# Patient Record
Sex: MALE | Race: WHITE | Employment: UNEMPLOYED | URBAN - METROPOLITAN AREA
[De-identification: names, ages, dates, MRNs, and addresses within clinical notes are randomized per-mention and may not be internally consistent; named-entity substitution may affect disease eponyms.]

---

## 2020-07-13 ENCOUNTER — HOSPITAL ENCOUNTER (EMERGENCY)
Age: 55
Discharge: HOME | End: 2020-07-13
Attending: EMERGENCY MEDICINE
Payer: MEDICARE

## 2020-07-13 VITALS
TEMPERATURE: 97.8 F | WEIGHT: 305 LBS | HEART RATE: 80 BPM | BODY MASS INDEX: 46.23 KG/M2 | SYSTOLIC BLOOD PRESSURE: 126 MMHG | HEIGHT: 68 IN | RESPIRATION RATE: 13 BRPM | OXYGEN SATURATION: 93 % | DIASTOLIC BLOOD PRESSURE: 77 MMHG

## 2020-07-13 PROCEDURE — 96365 THER/PROPH/DIAG IV INF INIT: CPT

## 2020-07-13 PROCEDURE — 12005 RPR S/N/A/GEN/TRK12.6-20.0CM: CPT

## 2020-07-13 PROCEDURE — 90715 TDAP VACCINE 7 YRS/> IM: CPT | Performed by: STUDENT IN AN ORGANIZED HEALTH CARE EDUCATION/TRAINING PROGRAM

## 2020-07-13 PROCEDURE — 96376 TX/PRO/DX INJ SAME DRUG ADON: CPT

## 2020-07-13 PROCEDURE — 6370000000 HC RX 637 (ALT 250 FOR IP): Performed by: STUDENT IN AN ORGANIZED HEALTH CARE EDUCATION/TRAINING PROGRAM

## 2020-07-13 PROCEDURE — 96375 TX/PRO/DX INJ NEW DRUG ADDON: CPT

## 2020-07-13 PROCEDURE — 99283 EMERGENCY DEPT VISIT LOW MDM: CPT

## 2020-07-13 PROCEDURE — 6370000000 HC RX 637 (ALT 250 FOR IP)

## 2020-07-13 PROCEDURE — 6360000002 HC RX W HCPCS: Performed by: STUDENT IN AN ORGANIZED HEALTH CARE EDUCATION/TRAINING PROGRAM

## 2020-07-13 PROCEDURE — 2580000003 HC RX 258: Performed by: STUDENT IN AN ORGANIZED HEALTH CARE EDUCATION/TRAINING PROGRAM

## 2020-07-13 PROCEDURE — 90471 IMMUNIZATION ADMIN: CPT | Performed by: STUDENT IN AN ORGANIZED HEALTH CARE EDUCATION/TRAINING PROGRAM

## 2020-07-13 RX ORDER — DOXYCYCLINE 100 MG/1
100 TABLET ORAL 2 TIMES DAILY
Qty: 20 TABLET | Refills: 0 | Status: SHIPPED | OUTPATIENT
Start: 2020-07-13 | End: 2020-07-23

## 2020-07-13 RX ORDER — CEFAZOLIN SODIUM 1 G/50ML
INJECTION, SOLUTION INTRAVENOUS
Status: DISCONTINUED
Start: 2020-07-13 | End: 2020-07-13 | Stop reason: HOSPADM

## 2020-07-13 RX ADMIN — Medication 3 ML: at 15:12

## 2020-07-13 RX ADMIN — HYDROMORPHONE HYDROCHLORIDE 1 MG: 1 INJECTION, SOLUTION INTRAMUSCULAR; INTRAVENOUS; SUBCUTANEOUS at 13:54

## 2020-07-13 RX ADMIN — Medication 6 ML: at 15:13

## 2020-07-13 RX ADMIN — Medication 3 ML: at 13:16

## 2020-07-13 RX ADMIN — HYDROMORPHONE HYDROCHLORIDE 1 MG: 1 INJECTION, SOLUTION INTRAMUSCULAR; INTRAVENOUS; SUBCUTANEOUS at 13:15

## 2020-07-13 RX ADMIN — HYDROMORPHONE HYDROCHLORIDE 1 MG: 1 INJECTION, SOLUTION INTRAMUSCULAR; INTRAVENOUS; SUBCUTANEOUS at 13:04

## 2020-07-13 RX ADMIN — Medication 3 ML: at 15:13

## 2020-07-13 RX ADMIN — TETANUS TOXOID, REDUCED DIPHTHERIA TOXOID AND ACELLULAR PERTUSSIS VACCINE, ADSORBED 0.5 ML: 5; 2.5; 8; 8; 2.5 SUSPENSION INTRAMUSCULAR at 13:03

## 2020-07-13 RX ADMIN — CEFAZOLIN 1 G: 1 INJECTION, POWDER, FOR SOLUTION INTRAMUSCULAR; INTRAVENOUS at 13:25

## 2020-07-13 ASSESSMENT — ENCOUNTER SYMPTOMS
SHORTNESS OF BREATH: 0
NAUSEA: 0
SORE THROAT: 0
RHINORRHEA: 0
ABDOMINAL PAIN: 0
CHEST TIGHTNESS: 0
VOMITING: 0
CONSTIPATION: 0
DIARRHEA: 0

## 2020-07-13 ASSESSMENT — PAIN SCALES - GENERAL
PAINLEVEL_OUTOF10: 10
PAINLEVEL_OUTOF10: 7
PAINLEVEL_OUTOF10: 10

## 2020-07-13 NOTE — ED PROVIDER NOTES
[facility]     Emergency Department     Faculty Note/ Attestation      Pt Name: Britni Burgos                                       MRN: [id number]  [place] 1965  Date of evaluation: 7/13/2020  Patients PCP:    No primary care provider on file. Attestation  I performed a history and physical examination of the patient/ or directly observed  and discussed management with the resident. I reviewed the residents note and agree with the documented findings and plan of care. Any areas of disagreement are noted on the chart. I was personally present for the key portions of any procedures. I have documented in the chart those procedures where I was not present during the key portions. I have reviewed the emergency nurses triage note. I agree with the chief complaint, past medical history, past surgical history, allergies, medications, social and family history as documented unless otherwise noted below. For Physician Assistant/ Nurse Practitioner cases/documentation I have personally evaluated this patient and have completed at least one if not all key elements of the E/M (history, physical exam, and MDM). Additional findings are as noted. Initial Screens:        [place] Coma Scale  Eye Opening: Spontaneous  Best Verbal Response: Oriented  Best Motor Response: Obeys commands  [place] Coma Scale Score: 15    Vitals:    Vitals:    07/13/20 1237   BP: 126/77   Pulse: 80   Resp: 13   Temp: 97.8 °F (36.6 °C)   TempSrc: Oral   SpO2: 93%   Weight: (!) 305 lb (138.3 kg)   Height: 5' 8\" (1.727 m)       Chief Complaint      Chief Complaint   Patient presents with    Laceration     leg laceration R lower leg          height is 5' 8\" (1.727 m) and weight is 305 lb (138.3 kg) (abnormal). His oral temperature is 97.8 °F (36.6 °C). His blood pressure is 126/77 and his pulse is 80. His respiration is 13 and oxygen saturation is 93%.             DIAGNOSTIC RESULTS       RADIOLOGY:   No orders to display         LABS:  Labs Reviewed - No data to display      EMERGENCY DEPARTMENT COURSE:     -------------------------      BP: 126/77, Temp: 97.8 °F (36.6 °C), Pulse: 80, Resp: 13    System Problem List     There is no problem list on file for this patient. Comments  Chronic Prob List noted          Sharp MD, F.A.C.E.P.   Attending Emergency Physician            Rick Bangura MD  07/13/20 [id number]

## 2020-07-13 NOTE — ED NOTES
Bed: 24  Expected date:   Expected time:   Means of arrival:   Comments:  Sadi Swanson RN  07/13/20 [id number]

## 2020-07-13 NOTE — ED NOTES
Dr. Ramsey Slice at bedside to perform complex laceration repair.       Juany Nava RN  07/13/20 3432

## 2020-07-13 NOTE — ED PROVIDER NOTES
abdominal pain, constipation, diarrhea, nausea and vomiting. Genitourinary: Negative for dysuria and urgency. Skin: Positive for wound. Neurological: Negative for dizziness, syncope, weakness and light-headedness. PHYSICAL EXAM   (up to 7 for level 4, 8 or more forlevel 5)      INITIAL VITALS:   Vitals:    07/13/20 1237   BP: 126/77   Pulse: 80   Resp: 13   Temp: 97.8 °F (36.6 °C)   SpO2: 93%        Physical Exam  Vitals signs and nursing note reviewed. Constitutional:       Appearance: Normal appearance. He is obese. HENT:      Head: Normocephalic and atraumatic. Nose: Nose normal.      Mouth/Throat:      Mouth: Mucous membranes are moist.   Eyes:      Extraocular Movements: Extraocular movements intact. Pupils: Pupils are equal, round, and reactive to light. Neck:      Musculoskeletal: Normal range of motion. Cardiovascular:      Rate and Rhythm: Normal rate. Pulses: Normal pulses. Heart sounds: Normal heart sounds. Pulmonary:      Breath sounds: Normal breath sounds. Abdominal:      General: Bowel sounds are normal.      Palpations: Abdomen is soft. Tenderness: There is no abdominal tenderness. Musculoskeletal: Normal range of motion. Skin:     General: Skin is warm. Capillary Refill: Capillary refill takes less than 2 seconds. Findings: Signs of injury and laceration present. No bruising or ecchymosis. Comments: Patient with a sizable U-shaped laceration. Each side of the you is approximately 4 to 5 cm in length. Laceration involves the subcutaneous adipose tissue, but no muscular involvement from what I can see during my initial examination. Neurological:      General: No focal deficit present. Mental Status: He is alert. DIFFERENTIAL  DIAGNOSIS       Initial MDM/Plan: 54 y.o. male who presents with a large U-shaped laceration to his right lower shin.   Patient was on a fishing boat when he ran into a seat pedestal, resulting in the laceration. Patient immediately applied a tourniquet to stop the bleeding. EMS applied pressure dressing and achieved hemostasis. Plan to irrigate wound, closed subcutaneous tissue with simple interrupted 3-0 Vicryl sutures, and closed skin using staples. Patient to receive Dilaudid for pain control, Ancef 1 g, LET for anesthesia of the wound. Will discharge with doxycycline and close follow-up for wound check. DIAGNOSTIC RESULTS / EMERGENCYDEPARTMENT COURSE / MDM       EMERGENCY DEPARTMENT COURSE:  ED Course as of Jul 13 1504   Mon Jul 13, 2020   1323 Patient tolerant to pain medicine, has Dilaudid pain pump that runs continuously for chronic neck pain. Gave 1 mg of Dilaudid, patient was still in severe pain. Second 1 mg Dilaudid dose made patient more comfortable. I applied to 3 mL LET syringes to open wound. Will give 15 minutes before reevaluation and wound exploration and irrigation. Tetanus given, 1 g of Ancef ordered    [JT]   1339 Pt's wound numb. Will start irrigation    [JT]      ED Course User Index  [JT] Clare Scott MD          PROCEDURES:  Lac Repair    Date/Time: 7/13/2020 3:05 PM  Performed by: Clare Scott MD  Authorized by: Titus Lea MD     Consent:     Consent obtained:  Verbal    Consent given by:  Patient    Risks discussed:  Infection, pain and poor wound healing    Alternatives discussed:  No treatment  Anesthesia (see MAR for exact dosages): Anesthesia method:  Topical application    Topical anesthetic:  LET  Laceration details:     Location:  Leg    Leg location:  R lower leg    Length (cm):  20    Depth (mm):  5  Repair type:     Repair type:   Intermediate  Exploration:     Hemostasis achieved with:  LET    Wound exploration: entire depth of wound probed and visualized      Contaminated: no    Treatment:     Area cleansed with:  Saline    Amount of cleaning:  Extensive    Irrigation solution:  Sterile saline    Irrigation method:  Pressure wash    Visualized foreign bodies/material removed: no    Subcutaneous repair:     Suture size:  3-0    Suture material:  Vicryl    Suture technique:  Simple interrupted    Number of sutures:  6  Skin repair:     Repair method:  Staples    Number of staples:  15  Approximation:     Approximation:  Close  Post-procedure details:     Dressing:  Antibiotic ointment and tube gauze    Patient tolerance of procedure: Tolerated well, no immediate complications          FINAL IMPRESSION      1. Laceration of right lower extremity, initial encounter    Patient with large [id number] U-shaped laceration of the right lower extremity while out fishing. Dorsalis pedis pulse was intact, patient had full range of motion of the ankle joint. Anesthesia was achieved with 6 syringes of 3mL LET. Wound was extensively irrigated with sterile saline. Subcutaneous repair achieved with six 3-0 Vicryl simple interrupted sutures. Skin repair was achieved with 15 staples along the length of the wound. The wound was well approximated. Patient received 1 g of Ancef as well as 3 mg Dilaudid for pain control. Medically safe to discharge home with 10 days doxycycline prescription and instructed for wound check in 24 to 48 hours with PCP.   DISPOSITION / PLAN     DISPOSITION        PATIENT REFERRED TO:  [facility]  [address] [address]  [phone]  Go to   If symptoms worsen      DISCHARGE MEDICATIONS:  New Prescriptions    DOXYCYCLINE MONOHYDRATE (ADOXA) 100 MG TABLET    Take 1 tablet by mouth 2 times daily for 10 days       Tomer Cook MD  Emergency Medicine Resident    (Please note that portions of this note were completed with a voice recognition program.Efforts were made to edit the dictations but occasionally words are mis-transcribed.)       Tomer Cook MD  Resident  07/13/20 6507

## 2020-07-13 NOTE — ED TRIAGE NOTES
Male presents to ED with a R lower leg lac. Pt had a boat pedestal fall on his leg, and applied TQ in the field. Pt had TQ on approx 30 mins, and it was converted in field to pressure dressing. EMS gave ketoralac 30 mg, 1 percocet, and dilauded via pt pain pump (neck injury). Pt has a pressure dressing on R lower leg, Pedal pulse present 2+, able to move toes, full sensation, no numbness or tingling. Pt rates pain as 3/10. NAD, no sign of hemmorhage through pressure dressing.